# Patient Record
Sex: MALE | Race: WHITE | NOT HISPANIC OR LATINO | Employment: FULL TIME | URBAN - METROPOLITAN AREA
[De-identification: names, ages, dates, MRNs, and addresses within clinical notes are randomized per-mention and may not be internally consistent; named-entity substitution may affect disease eponyms.]

---

## 2023-01-20 PROBLEM — L05.91 PILONIDAL CYST: Status: ACTIVE | Noted: 2023-01-20

## 2023-01-20 PROBLEM — L73.2 HIDRADENITIS: Status: ACTIVE | Noted: 2023-01-20

## 2023-05-08 ENCOUNTER — TELEPHONE (OUTPATIENT)
Dept: PHARMACY | Facility: CLINIC | Age: 33
End: 2023-05-08

## 2023-05-08 NOTE — TELEPHONE ENCOUNTER
Mónica, this is Toni Chris, clinical pharmacist with [facility] that is part of your care team.  We have begun working on your prescription that your doctor has sent us. Our next steps include:     Working with your insurance company to obtain approval for your medication  Working with you to ensure your medication is affordable     We will be calling you along the way with updates on your medication but if you have any concerns or receive information that you would like to discuss please reach us at [phone].    Welcome call outcome: No answer/Unable to leave voicemail (mailbox full).

## 2023-05-11 ENCOUNTER — SPECIALTY PHARMACY (OUTPATIENT)
Dept: PHARMACY | Facility: CLINIC | Age: 33
End: 2023-05-11

## 2023-05-12 ENCOUNTER — SPECIALTY PHARMACY (OUTPATIENT)
Dept: PHARMACY | Facility: CLINIC | Age: 33
End: 2023-05-12

## 2023-05-12 DIAGNOSIS — L73.2 HIDRADENITIS: Primary | ICD-10-CM

## 2023-05-12 NOTE — TELEPHONE ENCOUNTER
Specialty Pharmacy - Initial Clinical Assessment    Specialty Medication Orders Linked to Encounter      Flowsheet Row Most Recent Value   Medication #1 adalimumab (HUMIRA,CF, PEN CROHNS-UC-HS) 80 mg/0.8 mL PnKt (Order#[id number], Rx#[id number])   Medication #2 adalimumab (HUMIRA,CF, PEN) 40 mg/0.4 mL PnKt (Order#[id number], Rx#[id number])          Patient Diagnosis   L73.2 - Hidradenitis    Subjective    Patrick Ellis is a 33 y.o. male, who is followed by the specialty pharmacy service for management and education.    Recent Encounters       Date Type Provider Description    05/12/2023 Specialty Pharmacy Toni Chris PharmD Initial Clinical Assessment    05/11/2023 Specialty Pharmacy Toni Chris PharmD Referral Authorization            Current Outpatient Medications   Medication Sig    adalimumab (HUMIRA,CF, PEN CROHNS-UC-HS) 80 mg/0.8 mL PnKt Inject 2 pens (160 mg total) into the skin on day 1 then 1 pen (80 mg total) into the skin on day 15.    adalimumab (HUMIRA,CF, PEN) 40 mg/0.4 mL PnKt Inject 0.4 mLs (40 mg total) into the skin every 7 days.    amLODIPine (NORVASC) 2.5 MG tablet Take 2.5 mg by mouth.    metoprolol tartrate (LOPRESSOR) 50 MG tablet Take 50 mg by mouth.    pantoprazole (PROTONIX) 40 MG tablet Take 40 mg by mouth.   Last reviewed on 5/12/2023  1:42 PM by Toni Chris PharmD    Review of patient's allergies indicates:  No Known AllergiesLast reviewed on  5/12/2023 1:42 PM by Toni Longo    Drug Interactions    Drug interactions evaluated: yes  Clinically relevant drug interactions identified: no  Provided the patient with educational material regarding drug interactions: not applicable         Adverse Effects    Rash: Pos  Ulcers/sores: Pos       Assessment Questions - Documented Responses      Flowsheet Row Most Recent Value   Assessment    Medication Reconciliation completed for patient Yes   During the past 4 weeks, has patient missed any activities  "due to condition or medication? No   During the past 4 weeks, did patient have any of the following urgent care visits? None   Goals of Therapy Status Discussed (new start)   Status of the patients ability to self-administer: Is Able   All education points have been covered with patient? Yes, supplemental printed education provided   Welcome packet contents reviewed and discussed with patient? Yes   Assesment completed? Yes   Plan Therapy being initiated   Do you need to open a clinical intervention (i-vent)? No   Do you want to schedule first shipment? Yes          Refill Questions - Documented Responses      Flowsheet Row Most Recent Value   Patient Availability and HIPAA Verification    Does patient want to proceed with activity? Yes   HIPAA/medical authority confirmed? Yes   Relationship to patient of person spoken to? Self   Refill Screening Questions    When does the patient need to receive the medication? 05/16/23   Refill Delivery Questions    How will the patient receive the medication? Mail   When does the patient need to receive the medication? 05/16/23   Shipping Address Home   Address in [facility] confirmed and updated if neccessary? Yes   Expected Copay ($) 0   Is the patient able to afford the medication copay? Yes   Payment Method zero copay   Days supply of Refill 28   Supplies needed? No supplies needed   Refill activity completed? Yes   Refill activity plan Refill scheduled   Shipment/Pickup Date: 05/15/23            Objective    He has no past medical history on file.    Tried/failed medications: oral antibiotics     BP Readings from Last 4 Encounters:   05/04/23 120/84   01/20/23 120/76     Ht Readings from Last 4 Encounters:   05/04/23 5' 9" (1.753 m)   01/20/23 5' 9" (1.753 m)     Wt Readings from Last 4 Encounters:   05/04/23 83.5 kg (184 lb)   01/20/23 83.5 kg (184 lb)       The goals of prescribed drug therapy management include:  Supporting patient to meet the prescriber's medical " treatment objectives  Improving or maintaining quality of life  Maintaining optimal therapy adherence  Minimizing and managing side effects      Goals of Therapy Status: Discussed (new start)    Assessment/Plan  Patient plans to start therapy on 05/16/23      Indication, dosage, appropriateness, effectiveness, safety and convenience of his specialty medication(s) were reviewed today.     Patient Education   Patient received education on the following:   Expectations and possible outcomes of therapy  Proper use, timely administration, and missed dose management  Duration of therapy  Side effects, including prevention, minimization, and management  Contraindications and safety precautions  New or changed medications, including prescribe and over the counter medications and supplements  Reviews recommended vaccinations, as appropriate  Storage, safe handling, and disposal    Tasks added this encounter   No tasks added.   Tasks due within next 3 months   5/11/2023 - Initial Clinical Assessment/Patient Education (Annual Reassessment)  5/11/2023 - Set up Initial Fill     Toni Chris, PharmD  [facility]  [address]  [facility] [address]  Phone: [phone]  Fax: [phone]

## 2023-05-18 ENCOUNTER — TELEPHONE (OUTPATIENT)
Dept: PHARMACY | Facility: CLINIC | Age: 33
End: 2023-05-18

## 2023-05-18 NOTE — TELEPHONE ENCOUNTER
Incoming call from wife inquiring about when they need to call for Humira refill. Reviewed refill process with Umm and that [facility] will be pro-actively making calls about a week before his next dose is due. Informed that we will never ship out med w/o speaking to someone first as we don't have autorefill. Reviewed how to dispose of used pens and urged wife to call Hum[facility] back (she signed pt up) as they should have a safe sharps disposal program. Reports they are currently using a milk jug. Per Umm, she has been the one to give the injections and asked as a back up to please call her for refill in the event Patrick doesn't . He works days so it may be hard to get a hold of him for refill. Made note in wamb. No further questions and thankful for [facility] services.

## 2023-06-05 ENCOUNTER — SPECIALTY PHARMACY (OUTPATIENT)
Dept: PHARMACY | Facility: CLINIC | Age: 33
End: 2023-06-05

## 2023-06-05 NOTE — TELEPHONE ENCOUNTER
Specialty Pharmacy - Refill Coordination    Specialty Medication Orders Linked to Encounter      Flowsheet Row Most Recent Value   Medication #1 adalimumab (HUMIRA,CF, PEN) 40 mg/0.4 mL PnKt (Order#[id number], Rx#[id number])            Refill Questions - Documented Responses      Flowsheet Row Most Recent Value   Patient Availability and HIPAA Verification    Does patient want to proceed with activity? Yes   HIPAA/medical authority confirmed? Yes   Relationship to patient of person spoken to? Self   Refill Screening Questions    When does the patient need to receive the medication? 06/10/23   Refill Delivery Questions    How will the patient receive the medication? Mail   When does the patient need to receive the medication? 06/10/23   Shipping Address Home   Address in [facility] confirmed and updated if neccessary? Yes   Expected Copay ($) 0   Is the patient able to afford the medication copay? Yes   Payment Method zero copay   Days supply of Refill 28   Supplies needed? No supplies needed   Refill activity completed? Yes   Refill activity plan Refill scheduled   Shipment/Pickup Date: 06/06/23            Current Outpatient Medications   Medication Sig    adalimumab (HUMIRA,CF, PEN CROHNS-UC-HS) 80 mg/0.8 mL PnKt Inject 2 pens (160 mg total) into the skin on day 1 then 1 pen (80 mg total) into the skin on day 15.    adalimumab (HUMIRA,CF, PEN) 40 mg/0.4 mL PnKt Inject 0.4 mLs (40 mg total) into the skin every 7 days.    amLODIPine (NORVASC) 2.5 MG tablet Take 2.5 mg by mouth.    metoprolol tartrate (LOPRESSOR) 50 MG tablet Take 50 mg by mouth.    pantoprazole (PROTONIX) 40 MG tablet Take 40 mg by mouth.   Last reviewed on 5/12/2023  1:42 PM by Toni Chris PharmD    Review of patient's allergies indicates:  No Known Allergies Last reviewed on  5/12/2023 1:42 PM by Toni Longo      Tasks added this encounter   No tasks added.   Tasks due within next 3 months   No tasks due.     Toni  Dot, [facility]  [address]  [facility] [address]  Phone: [phone]  Fax: [phone]

## 2023-06-27 ENCOUNTER — SPECIALTY PHARMACY (OUTPATIENT)
Dept: PHARMACY | Facility: CLINIC | Age: 33
End: 2023-06-27

## 2023-06-27 NOTE — TELEPHONE ENCOUNTER
Specialty Pharmacy - Refill Coordination    Specialty Medication Orders Linked to Encounter      Flowsheet Row Most Recent Value   Medication #1 adalimumab (HUMIRA,CF, PEN CROHNS-UC-HS) 80 mg/0.8 mL PnKt (Order#[id number], Rx#[id number])            Refill Questions - Documented Responses      Flowsheet Row Most Recent Value   Patient Availability and HIPAA Verification    Does patient want to proceed with activity? Yes   HIPAA/medical authority confirmed? Yes   Relationship to patient of person spoken to? Self   Refill Screening Questions    Changes to allergies? No   Changes to medications? No   New conditions since last clinic visit? No   Unplanned office visit, urgent care, ED, or hospital admission in the last 4 weeks? No   How does patient/caregiver feel medication is working? Good   Financial problems or insurance changes? No   How many doses of your specialty medications were missed in the last 4 weeks? 0   Would patient like to speak to a pharmacist? No   When does the patient need to receive the medication? 06/30/23   Refill Delivery Questions    How will the patient receive the medication? Mail   When does the patient need to receive the medication? 06/30/23   Shipping Address Home   Address in [facility] confirmed and updated if neccessary? Yes   Expected Copay ($) 0   Is the patient able to afford the medication copay? Yes   Payment Method zero copay   Days supply of Refill 28   Refill activity completed? Yes   Refill activity plan Refill scheduled   Shipment/Pickup Date: 06/28/23            Current Outpatient Medications   Medication Sig    adalimumab (HUMIRA,CF, PEN) 40 mg/0.4 mL PnKt Inject 0.4 mLs (40 mg total) into the skin every 7 days.    amLODIPine (NORVASC) 2.5 MG tablet Take 2.5 mg by mouth.    metoprolol tartrate (LOPRESSOR) 50 MG tablet Take 50 mg by mouth.    pantoprazole (PROTONIX) 40 MG tablet Take 40 mg by mouth.   Last reviewed on 5/12/2023  1:42 PM by Toni Chris  PharmD    Review of patient's allergies indicates:  No Known Allergies Last reviewed on  5/12/2023 1:42 PM by Toni Longo      Tasks added this encounter   No tasks added.   Tasks due within next 3 months   No tasks due.     [facility]  [address] [facility] [address]  Phone: [phone]  Fax: [phone]

## 2023-07-19 ENCOUNTER — PATIENT MESSAGE (OUTPATIENT)
Dept: PHARMACY | Facility: CLINIC | Age: 33
End: 2023-07-19

## 2023-07-31 ENCOUNTER — SPECIALTY PHARMACY (OUTPATIENT)
Dept: PHARMACY | Facility: CLINIC | Age: 33
End: 2023-07-31

## 2023-07-31 NOTE — TELEPHONE ENCOUNTER
Specialty Pharmacy - Refill Coordination    Specialty Medication Orders Linked to Encounter      Flowsheet Row Most Recent Value   Medication #1 adalimumab (HUMIRA,CF, PEN) 40 mg/0.4 mL PnKt (Order#[id number], Rx#[id number])            Refill Questions - Documented Responses      Flowsheet Row Most Recent Value   Patient Availability and HIPAA Verification    Does patient want to proceed with activity? Yes   HIPAA/medical authority confirmed? Yes   Relationship to patient of person spoken to? Self   Refill Screening Questions    Changes to allergies? No   Changes to medications? No   New conditions since last clinic visit? No   Unplanned office visit, urgent care, ED, or hospital admission in the last 4 weeks? No   How does patient/caregiver feel medication is working? Excellent   Financial problems or insurance changes? No   How many doses of your specialty medications were missed in the last 4 weeks? 0   Would patient like to speak to a pharmacist? No   When does the patient need to receive the medication? 08/02/23   Refill Delivery Questions    How will the patient receive the medication? Mail   When does the patient need to receive the medication? 08/02/23   Shipping Address Home   Expected Copay ($) 0   Is the patient able to afford the medication copay? Yes   Payment Method zero copay   Days supply of Refill 28   Supplies needed? No supplies needed   Refill activity completed? Yes   Refill activity plan Refill scheduled   Shipment/Pickup Date: 08/01/23            Current Outpatient Medications   Medication Sig    adalimumab (HUMIRA,CF, PEN) 40 mg/0.4 mL PnKt Inject 0.4 mLs (40 mg total) into the skin every 7 days.    amLODIPine (NORVASC) 2.5 MG tablet Take 2.5 mg by mouth.    metoprolol tartrate (LOPRESSOR) 50 MG tablet Take 50 mg by mouth.    pantoprazole (PROTONIX) 40 MG tablet Take 40 mg by mouth.   Last reviewed on 5/12/2023  1:42 PM by Toni Chris PharmD    Review of patient's allergies  indicates:  No Known Allergies Last reviewed on  5/12/2023 1:42 PM by Toni Longo      Tasks added this encounter   No tasks added.   Tasks due within next 3 months   No tasks due.     Toni Chris, PharmD  [facility]  [address] [place], [place] [address]  [facility] [address]  Phone: [phone]  Fax: [phone]

## 2023-08-11 NOTE — TELEPHONE ENCOUNTER
Incoming call from patient's wife stating that Humira was left out over night on the counter as patient and wife were tired and forgot to give dose last night.  Inquired if patient would be able to still take dose.  Able to determine on [employer]ik[employer] that Humira can be stored at room temp for 14 days if needed.  Counseled wife that patient is able to take dose.  No further questions.